# Patient Record
(demographics unavailable — no encounter records)

---

## 2024-10-28 NOTE — PHYSICAL EXAM
[Chaperone Present] : A chaperone was present in the examining room during all aspects of the physical examination [71035] : A chaperone was present during the pelvic exam. [FreeTextEntry2] : chelsey [Appropriately responsive] : appropriately responsive [Alert] : alert [No Acute Distress] : no acute distress [No Lymphadenopathy] : no lymphadenopathy [Regular Rate Rhythm] : regular rate rhythm [No Murmurs] : no murmurs [Clear to Auscultation B/L] : clear to auscultation bilaterally [Soft] : soft [Non-tender] : non-tender [Non-distended] : non-distended [No HSM] : No HSM [No Lesions] : no lesions [No Mass] : no mass [Oriented x3] : oriented x3 [Examination Of The Breasts] : a normal appearance [No Masses] : no breast masses were palpable [Labia Majora] : normal [Labia Minora] : normal [Normal] : normal [Uterine Adnexae] : normal [Declined] : Patient declined rectal exam

## 2024-10-28 NOTE — HISTORY OF PRESENT ILLNESS
[FreeTextEntry1] : 37-year-old -0-1-1 last menstrual cycle was 2024 presents with positive pregnancy test.  Denies pain bleeding or discharge.  Bedside sonogram shows intrauterine gestation with gestational pro no fetal heart, perhaps too early.

## 2024-10-28 NOTE — DISCUSSION/SUMMARY
[FreeTextEntry1] : 37-year-old -0-1-1 last menstrual cycle was 2024 presents with positive pregnancy test.  Bedside sonogram shows intrauterine gestation with a pole perhaps too early for fetal heart.  GC chlamydia sent.  Patient to return in 2 weeks for follow-up.  Prenatal vitamins prescribed. Safe sexual practices discussed.

## 2024-12-02 NOTE — PLAN
[FreeTextEntry1] : 36 yo s/p office ERIK.  1. s/p office ERIK - All available medical records reviewed - All consents signed today, all questions/concerns addressed - pt does not desire medical management - Patient offered pamphlet for support services  - Genetics- requesting; will send chromosomes and hold cells     2. ID/Neuro - GC/CT not indicated - doxycycline 200 mg Rx sent to pharmacy - Reviewed Tylenol 975mg -1000mg q6 hours -  Ibuprofen 600 mg po q 6 prn- Rx sent to pharmacy   3. Labs/Blood type  - No hx of anemia - RH testing not indicated   4. Contraception/Conception - Patient counseled on all contraceptive options - aware of rapid return to fertility -Patient desires pregnancy, advised to wait for genetic test results   5. Post-op - Post-operative follow-up phone call virtual visit to be scheduled in 2 weeks - pre- and Post-operative instruction sheet given, reviewed bleeding and infection precautions - Provided 24 hour contact phone number - All questions/concerns of patient addressed to their satisfaction   I spent a total of 80 minutes on the encounter evaluating and treating the patient.  Total time does not include the time spent on separately billable procedures performed on this DOS.

## 2024-12-02 NOTE — HISTORY OF PRESENT ILLNESS
[FreeTextEntry1] : Referred by Dr. Howard  38 yo  at 10w6d by EDC 25 by LMP 24. Pt referred for miscarriage management and would like to know all of her options.  I have independently reviewed and interpreted ultrasound performed on 24. This ultrasound places the pregnancy with a CRL of 8w2d with no FH.  All: NKDA Meds: denies Obhx: c-scex1, SABx1 Gynhx: fibroids noted on last sonogram PMH/PSH: as above SH: deniesx3  Reviewed common side effects of medication  as well as DA. Medical   Patient denies medical history of: Liver disease, Renal failure, Chronic adrenal failure, Long term systemic corticosteroid use, IUD in place, Anticoagulant use of hemorrhagic disorder, Inherited porphyrias, Anemia, Allergy to mifepristone, misoprostol or other prostaglandins  They understand that 2-7% of people who take medication  will need more medication or a surgical procedure to empty the uterus. They understand that medication  is not reversible. Common side effects and additional risks below reviewed.  Common side effects: cramping, bleeding, low grade fever, diarrhea, nausea, vomiting, headache, dizziness, back pain, feeling tired  The risks of medication  including: -	Continuing pregnancy, pregnancy tissue or blood clots in the uterus and the need for further medication or surgery -	Incomplete  causing heavy bleeding, infection, or both (which may require other testing or treatments such as further medication or surgery) -	Bleeding too much or too long which may require further treatment with medication or surgery, or a blood transfusion -	Infection in the uterus, which may require further treatment with medication, surgery or antibiotics.  It may also require hospital admission -	Allergic reaction to any or all of the medications used    Risks of Dilation and Aspiration: 1.	Infection: Patient was counseled on risk of infection and the use of prophylactic antibiotics, signs/symptoms of pre- and post-operative infection were reviewed.  2.	Hemorrhage: Patient was counseled on the risk of hemorrhage, possibly requiring blood (and/or blood products) transfusion, management including use of but not limited to uterotonic medications.  3.	Injury/Perforation:  Risk of injury to vagina, cervix, uterus reviewed. Patient was counseled on the risk of uterine perforation with/without need for laparoscopy/laparotomy with/without injury to adjacent organs such as bowel/bladder.  4.            Risk of retained products of conception  with/without need for medication or suction procedure to empty the uterus.    PT INITIALLY CHOSE OR PROCEDURE AND THEN REQUESTED IN OFFICE PROCEDURE. ACCOMMODATIONS MADE FOR SAME DAY.

## 2024-12-02 NOTE — PHYSICAL EXAM
[Chaperone Present] : A chaperone was present in the examining room during all aspects of the physical examination [FreeTextEntry2] : JENNIE Multani MA [Appropriately responsive] : appropriately responsive [Alert] : alert [No Acute Distress] : no acute distress [Oriented x3] : oriented x3

## 2024-12-04 NOTE — HISTORY OF PRESENT ILLNESS
[FreeTextEntry1] : 37-year-old -0-2-1 status post evacuation of the endometrium from missed  on 2024 presents for postop checkup.  Still complains of some spotting.  Partner has sperm cells reserved prior to his radiation treatment and they are considering possible IVF being that this is a second miscarriage and a role.

## 2024-12-04 NOTE — DISCUSSION/SUMMARY
[FreeTextEntry1] : 37-year-old -0-2-1 status post evacuation of the endometrium secondary to missed .  Pathology is still pending.  Patient to return in 3 months for follow-up.  Partner had sperm cells saved prior to his radiation treatment and they are considering perhaps IVF.